# Patient Record
Sex: FEMALE | Race: WHITE | URBAN - METROPOLITAN AREA
[De-identification: names, ages, dates, MRNs, and addresses within clinical notes are randomized per-mention and may not be internally consistent; named-entity substitution may affect disease eponyms.]

---

## 2017-02-25 ENCOUNTER — HOSPITAL ENCOUNTER (EMERGENCY)
Facility: CLINIC | Age: 62
Discharge: HOME | End: 2017-02-25
Attending: EMERGENCY MEDICINE | Admitting: EMERGENCY MEDICINE
Payer: OTHER MISCELLANEOUS

## 2017-02-25 VITALS
WEIGHT: 210 LBS | BODY MASS INDEX: 38.64 KG/M2 | OXYGEN SATURATION: 96 % | HEIGHT: 62 IN | SYSTOLIC BLOOD PRESSURE: 145 MMHG | TEMPERATURE: 98.4 F | RESPIRATION RATE: 18 BRPM | DIASTOLIC BLOOD PRESSURE: 83 MMHG

## 2017-02-25 DIAGNOSIS — S00.03XA SCALP CONTUSION: ICD-10-CM

## 2017-02-25 DIAGNOSIS — S09.90XA CLOSED HEAD INJURY, INITIAL ENCOUNTER: ICD-10-CM
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
## 2017-02-25 PROCEDURE — 99283 EMERGENCY DEPT VISIT LOW MDM: CPT

## 2017-02-25 ASSESSMENT — ENCOUNTER SYMPTOMS
HEADACHES: 1
NUMBNESS: 0
VOMITING: 0
NAUSEA: 1
WEAKNESS: 0

## 2017-02-25 NOTE — LETTER
EMERGENCY DEPARTMENT  [address] [facility] [address]  [phone]    2017    Doretha Alejandro  [address] [place] [address]  [place] (home)     : 1955      To Whom it may concern:    Doretha Alejandro was seen in our Emergency Department today, 2017.  I expect her condition to improve.  She may return to work but please excuse on 2017 so she can recover from her medical issue.    Sincerely,        Cade Martin

## 2017-02-25 NOTE — ED AVS SNAPSHOT
Emergency Department    [address] [place] [address]    Phone:  [phone]    Fax:  [phone]                                       Doretha Alejandro   MRN: [id number]    Department:   Emergency Department   Date of Visit:  2/25/2017           Patient Information     Date Of Birth          1955        Your diagnoses for this visit were:     Closed head injury, initial encounter     Scalp contusion     Fall from ladder, initial encounter        You were seen by Cade Martin MD.      Follow-up Information     Please follow up.    Why:  tylenol or motrin and ice for scalp pain - return if any concerns        Discharge Instructions       Discharge Instructions  Head Injury    You have been seen today for a head injury. You were checked for serious problems, like bleeding on the brain, but these problems cannot always be found right away.  Due to this risk, you should not be alone for 24 hours after your injury.  Follow up with your regular physician as needed. If you are taking a blood thinner, such as aspirin, Pradaxa  (dabigatran), Coumadin  (warfarin), or Plavix  (clopidogrel), you are at especially high risk for immediate or delayed bleeding, and need to re-check with a physician in 24 hours, or sooner if any of the symptoms below happen.     Return to the Emergency Department if:    You are confused, have amnesia, or you are not acting right.    Your headache gets worse or you start to have a really bad headache even with your recommended treatment plan.    You vomit more than once.    You have a convulsion or seizure.    You have trouble walking.    You have weakness or paralysis in an arm or a leg.    You have blood or fluid coming from your ears or nose.    You have new symptoms or anything that worries you.    Sleeping:  It is okay for you to sleep, but someone should wake you up as instructed by your doctor, and someone should check on you at your usual time to wake  up.     Activity:    Do not drive for at least 24 hours.    Do not drive if you have dizzy spells or trouble concentrating, or remembering things.    Do not return to any contact sports until cleared by your regular doctor.     Follow-up:  It is very important that you make an appointment with your clinic and go to the appointment.  If you do not follow-up with your regular doctor, it may result in missing an important development which could result in permanent injury or disability and/or lasting pain.  If there is any problem keeping your appointment, call your doctor or return to the Emergency Department.    MORE INFORMATION:    Concussion:  A concussion is a minor head injury that may cause temporary problems with the way your brain works.  Some symptoms include:  confusion, amnesia, nausea and vomiting, dizziness, fatigue, memory or concentration problems, irritability and sleep problems.    CT Scans: Your evaluation today may have included a CT scan (CAT scan) to look for things like bleeding or a skull fracture (break).  CT scans involve radiation and too many CT scans can cause serious health problems like cancer, especially in children.  Because of this, your doctor may not have ordered a CT scan today if they think you are at low risk for a serious or life threatening problem.    If you were given a prescription for medicine here today, be sure to read all of the information (including the package insert) that comes with your prescription.  This will include important information about the medicine, its side effects, and any warnings that you need to know about.  The pharmacist who fills the prescription can provide more information and answer questions you may have about the medicine.  If you have questions or concerns that the pharmacist cannot address, please call or return to the Emergency Department.       24 Hour Appointment Hotline       To make an appointment at any [facility], call  [phone] ([phone]). If you don't have a family doctor or clinic, we will help you find one. [facility] clinics are conveniently located to serve the needs of you and your family.             Review of your medicines      Our records show that you are taking the medicines listed below. If these are incorrect, please call your family doctor or clinic.        Dose / Directions Last dose taken    EFFEXOR PO        Take by mouth 3 times daily   Refills:  0        ESCITALOPRAM OXALATE PO   Dose:  15 mg        Take 15 mg by mouth   Refills:  0        LORAZEPAM PO   Dose:  0.5 mg        Take 0.5 mg by mouth   Refills:  0        METADATE CD PO   Dose:  20 mg        Take 20 mg by mouth   Refills:  0        NAPROXEN PO        Refills:  0        PRISTIQ PO        Refills:  0        SIMVASTATIN PO   Dose:  20 mg        Take 20 mg by mouth   Refills:  0        SYNTHROID PO        Refills:  0                Orders Needing Specimen Collection     None      Pending Results     No orders found from 2/23/2017 to 2/26/2017.            Pending Culture Results     No orders found from 2/23/2017 to 2/26/2017.             Test Results from your hospital stay            Clinical Quality Measure: Blood Pressure Screening     Your blood pressure was checked while you were in the emergency department today. The last reading we obtained was  BP: 145/83 . Please read the guidelines below about what these numbers mean and what you should do about them.  If your systolic blood pressure (the top number) is less than 120 and your diastolic blood pressure (the bottom number) is less than 80, then your blood pressure is normal. There is nothing more that you need to do about it.  If your systolic blood pressure (the top number) is 120-139 or your diastolic blood pressure (the bottom number) is 80-89, your blood pressure may be higher than it should be. You should have your blood pressure rechecked within a year by a primary care  "provider.  If your systolic blood pressure (the top number) is 140 or greater or your diastolic blood pressure (the bottom number) is 90 or greater, you may have high blood pressure. High blood pressure is treatable, but if left untreated over time it can put you at risk for heart attack, stroke, or kidney failure. You should have your blood pressure rechecked by a primary care provider within the next 4 weeks.  If your provider in the emergency department today gave you specific instructions to follow-up with your doctor or provider even sooner than that, you should follow that instruction and not wait for up to 4 weeks for your follow-up visit.        Thank you for choosing [facility]       Thank you for choosing [facility] for your care. Our goal is always to provide you with excellent care. Hearing back from our patients is one way we can continue to improve our services. Please take a few minutes to complete the written survey that you may receive in the mail after you visit with us. Thank you!        [employer] Information     [employer] lets you send messages to your doctor, view your test results, renew your prescriptions, schedule appointments and more. To sign up, go to [email or web address] . Click on \"Log in\" on the left side of the screen, which will take you to the Welcome page. Then click on \"Sign up Now\" on the right side of the page.     You will be asked to enter the access code listed below, as well as some personal information. Please follow the directions to create your username and password.     Your access code is: [email or web address]  Expires: 2017  8:10 PM     Your access code will  in 90 days. If you need help or a new code, please call your [facility] clinic or [phone].        Care EveryWhere ID     This is your Care EveryWhere ID. This could be used by other organizations to access your [facility] medical records  [id number]        After Visit Summary       This is your record. Keep this " with you and show to your community pharmacist(s) and doctor(s) at your next visit.

## 2017-02-25 NOTE — ED AVS SNAPSHOT
Emergency Department    [address] [facility] [address]    Phone:  [phone]    Fax:  [phone]                                       Doretha Alejandro   MRN: [id number]    Department:   Emergency Department   Date of Visit:  2/25/2017           After Visit Summary Signature Page     I have received my discharge instructions, and my questions have been answered. I have discussed any challenges I see with this plan with the nurse or doctor.    ..........................................................................................................................................  Patient/Patient Representative Signature      ..........................................................................................................................................  Patient Representative Print Name and Relationship to Patient    ..................................................               ................................................  Date                                            Time    ..........................................................................................................................................  Reviewed by Signature/Title    ...................................................              ..............................................  Date                                                            Time

## 2017-02-26 NOTE — DISCHARGE INSTRUCTIONS
Discharge Instructions  Head Injury    You have been seen today for a head injury. You were checked for serious problems, like bleeding on the brain, but these problems cannot always be found right away.  Due to this risk, you should not be alone for 24 hours after your injury.  Follow up with your regular physician as needed. If you are taking a blood thinner, such as aspirin, Pradaxa  (dabigatran), Coumadin  (warfarin), or Plavix  (clopidogrel), you are at especially high risk for immediate or delayed bleeding, and need to re-check with a physician in 24 hours, or sooner if any of the symptoms below happen.     Return to the Emergency Department if:    You are confused, have amnesia, or you are not acting right.    Your headache gets worse or you start to have a really bad headache even with your recommended treatment plan.    You vomit more than once.    You have a convulsion or seizure.    You have trouble walking.    You have weakness or paralysis in an arm or a leg.    You have blood or fluid coming from your ears or nose.    You have new symptoms or anything that worries you.    Sleeping:  It is okay for you to sleep, but someone should wake you up as instructed by your doctor, and someone should check on you at your usual time to wake up.     Activity:    Do not drive for at least 24 hours.    Do not drive if you have dizzy spells or trouble concentrating, or remembering things.    Do not return to any contact sports until cleared by your regular doctor.     Follow-up:  It is very important that you make an appointment with your clinic and go to the appointment.  If you do not follow-up with your regular doctor, it may result in missing an important development which could result in permanent injury or disability and/or lasting pain.  If there is any problem keeping your appointment, call your doctor or return to the Emergency Department.    MORE INFORMATION:    Concussion:  A concussion is a minor head  injury that may cause temporary problems with the way your brain works.  Some symptoms include:  confusion, amnesia, nausea and vomiting, dizziness, fatigue, memory or concentration problems, irritability and sleep problems.    CT Scans: Your evaluation today may have included a CT scan (CAT scan) to look for things like bleeding or a skull fracture (break).  CT scans involve radiation and too many CT scans can cause serious health problems like cancer, especially in children.  Because of this, your doctor may not have ordered a CT scan today if they think you are at low risk for a serious or life threatening problem.    If you were given a prescription for medicine here today, be sure to read all of the information (including the package insert) that comes with your prescription.  This will include important information about the medicine, its side effects, and any warnings that you need to know about.  The pharmacist who fills the prescription can provide more information and answer questions you may have about the medicine.  If you have questions or concerns that the pharmacist cannot address, please call or return to the Emergency Department.

## 2017-02-26 NOTE — ED PROVIDER NOTES
"  History     Chief Complaint:  Fall    HPI   Doretha Alejandro is a 61 year old female who presents to the emergency department today for evaluation after fall. Patient describes falling from the 3rd step of a ladder while at work. Patient reports landing first onto buttock and then backwards onto head, and has since developed a headache localized around the lump on her scalp. She denies loss of consciousness. Patient also reports some nausea but believes this to be related to anxiety. Patient denies anticoagulation. She also denies other associated pains, dental pains, numbness, weakness, or vomiting. No other concerns were voiced at this time.     Allergies:  Penicillincs     Medications:    Simvastatin  Lorazepam  Effexor  Pristiq  Synthroid  Metadate  Naproxen     Past Medical History:    History reviewed. No pertinent past medical history.    Past Surgical History:    History reviewed. No pertinent past surgical history.    Family History:    History reviewed. No pertinent family history.     Social History:  The patient was accompanied to the ED by visitor.  Smoking Status: Negative  Alcohol Use: Negative     Review of Systems   HENT: Negative for dental problem.    Gastrointestinal: Positive for nausea. Negative for vomiting.   Neurological: Positive for headaches. Negative for weakness and numbness.   All other systems reviewed and are negative.    Physical Exam   First Vitals:  BP: 145/83  Heart Rate: 88  Temp: 98.4  F (36.9  C)  Resp: 18  Height: 157.5 cm (5' 2\")  Weight: 95.3 kg (210 lb)  SpO2: 96 %      Physical Exam  SKIN:  No contusions or abrasions.  HEMATOLOGIC/IMMUNOLOGIC/LYMPHATIC:  No pallor.  HENT:  No facial trauma.  No oral or dental trauma.  Contusion of the scalp at the right side of the vertex.  EYES:  PERRL, no ocular trauma, normal EOM.  CARDIOVASCULAR:  Normal rate and a regular rhythm.  RESPIRATORY:  No respiratory distress, breath sounds equal and normal.  GASTROINTESTINAL:  Soft " nontender abdomen.  MUSCULOSKELETAL:  Full painless ROM of the head/neck/torso/limbs.  Cervical spine non-tender.  NEUROLOGIC:  Alert, conversant, GCS 15.  PSYCHIATRIC:  Normal mood.    Emergency Department Course     Emergency Department Course:  Nursing notes and vitals reviewed.  I performed an exam of the patient as documented above.   At 2004 the patient was evaluated and we discussed plan of care. Patient declines pain control at this time.     I personally reviewed the treatment plan with the Patient and answered all related questions prior to discharge.    Impression & Plan      Medical Decision Making:  Doretha Alejandro is a 61 year old female who presents after a fall from a low height. No concerning findings on exam or history to mandate emergent imaging. Work note provided for tomorrow and I advised her to return for any concerns.     Diagnosis:    ICD-10-CM    1. Closed head injury, initial encounter S09.90XA    2. Scalp contusion S00.03XA    3. Fall from ladder, initial encounter W11.XXXA      Disposition:   Discharge home; plan for follow up with PCP.    Scribe Disclosure:  I, Broderick Milian, am serving as a scribe at 8:01 PM on 2/25/2017 to document services personally performed by Cade Martin MD, based on my observations and the provider's statements to me.   EMERGENCY DEPARTMENT       Cade Martin MD  02/25/17 [id number]

## 2024-12-17 ENCOUNTER — APPOINTMENT (OUTPATIENT)
Dept: CT IMAGING | Facility: HOSPITAL | Age: 69
End: 2024-12-17
Attending: EMERGENCY MEDICINE
Payer: COMMERCIAL

## 2024-12-17 ENCOUNTER — HOSPITAL ENCOUNTER (EMERGENCY)
Facility: HOSPITAL | Age: 69
Discharge: HOME | End: 2024-12-17
Attending: EMERGENCY MEDICINE | Admitting: EMERGENCY MEDICINE
Payer: COMMERCIAL

## 2024-12-17 VITALS
WEIGHT: 208.2 LBS | RESPIRATION RATE: 16 BRPM | SYSTOLIC BLOOD PRESSURE: 129 MMHG | HEART RATE: 72 BPM | BODY MASS INDEX: 38.31 KG/M2 | HEIGHT: 62 IN | DIASTOLIC BLOOD PRESSURE: 85 MMHG | OXYGEN SATURATION: 96 % | TEMPERATURE: 98.1 F

## 2024-12-17 DIAGNOSIS — S00.03XA SCALP HEMATOMA, INITIAL ENCOUNTER: ICD-10-CM

## 2024-12-17 DIAGNOSIS — S09.90XA CLOSED HEAD INJURY, INITIAL ENCOUNTER: ICD-10-CM

## 2024-12-17 DIAGNOSIS — B35.4 TINEA CORPORIS: ICD-10-CM

## 2024-12-17 PROBLEM — E55.9 VITAMIN D DEFICIENCY: Status: ACTIVE | Noted: 2023-05-25

## 2024-12-17 PROBLEM — G51.39 HEMIFACIAL SPASM: Status: ACTIVE | Noted: 2019-01-28

## 2024-12-17 PROBLEM — J45.41 MODERATE PERSISTENT ASTHMA WITH ACUTE EXACERBATION: Status: ACTIVE | Noted: 2024-02-28

## 2024-12-17 PROBLEM — F33.8 SEASONAL DEPRESSION (H): Chronic | Status: ACTIVE | Noted: 2017-11-07

## 2024-12-17 PROCEDURE — 99284 EMERGENCY DEPT VISIT MOD MDM: CPT | Mod: 25 | Performed by: EMERGENCY MEDICINE

## 2024-12-17 PROCEDURE — 70450 CT HEAD/BRAIN W/O DYE: CPT

## 2024-12-17 PROCEDURE — 250N000013 HC RX MED GY IP 250 OP 250 PS 637: Performed by: EMERGENCY MEDICINE

## 2024-12-17 RX ORDER — HYDROCODONE BITARTRATE AND ACETAMINOPHEN 5; 325 MG/1; MG/1
1 TABLET ORAL EVERY 6 HOURS PRN
Qty: 10 TABLET | Refills: 0 | Status: SHIPPED | OUTPATIENT
Start: 2024-12-17

## 2024-12-17 RX ORDER — ONDANSETRON 4 MG/1
4 TABLET, ORALLY DISINTEGRATING ORAL EVERY 8 HOURS PRN
Qty: 15 TABLET | Refills: 0 | Status: SHIPPED | OUTPATIENT
Start: 2024-12-17

## 2024-12-17 RX ORDER — HYDROCODONE BITARTRATE AND ACETAMINOPHEN 5; 325 MG/1; MG/1
1 TABLET ORAL ONCE
Status: COMPLETED | OUTPATIENT
Start: 2024-12-17 | End: 2024-12-17

## 2024-12-17 RX ORDER — CLOTRIMAZOLE 1 %
CREAM (GRAM) TOPICAL 2 TIMES DAILY
Qty: 113 G | Refills: 0 | Status: SHIPPED | OUTPATIENT
Start: 2024-12-17 | End: 2025-01-07

## 2024-12-17 RX ADMIN — HYDROCODONE BITARTRATE AND ACETAMINOPHEN 1 TABLET: 5; 325 TABLET ORAL at 21:36

## 2024-12-17 ASSESSMENT — ACTIVITIES OF DAILY LIVING (ADL)
ADLS_ACUITY_SCORE: 41
ADLS_ACUITY_SCORE: 41

## 2024-12-17 ASSESSMENT — COLUMBIA-SUICIDE SEVERITY RATING SCALE - C-SSRS
1. IN THE PAST MONTH, HAVE YOU WISHED YOU WERE DEAD OR WISHED YOU COULD GO TO SLEEP AND NOT WAKE UP?: NO
6. HAVE YOU EVER DONE ANYTHING, STARTED TO DO ANYTHING, OR PREPARED TO DO ANYTHING TO END YOUR LIFE?: NO
2. HAVE YOU ACTUALLY HAD ANY THOUGHTS OF KILLING YOURSELF IN THE PAST MONTH?: NO

## 2024-12-18 NOTE — ED PROVIDER NOTES
EMERGENCY DEPARTMENT ENCOUNTER      NAME: Doretha Alejandro  AGE: 69 year old female  YOB: 1955  MRN: [id number]  EVALUATION DATE & TIME: No admission date for patient encounter.    PCP: Milagro Coronado    ED PROVIDER: Vita Harp M.D.      Chief Complaint   Patient presents with    Fall       FINAL IMPRESSION:  1. Closed head injury, initial encounter    2. Scalp hematoma, initial encounter    3. Tinea corporis        ED COURSE & MEDICAL DECISION MAKIN. Closed head injury.  Differentials considered include intracranial hemorrhage, fracture, concussion, post traumatic headache, neck fracture vs ligamentous injury vs musculoskeletal injury vs traumatic cord injury.  Neurologic exam normal without paresthesias to UE.   Head CT ordered given hematoma present. No laceration present. No blood thinner use.  NEXUS negative, no indication for neck CT.  No drugs or alcohol on board, do not suspect distracting injury.   Initially, patient declined pain medication. However, once she was roomed, she did request pain medication and I ordered 1 PO hydrocodone.  Educated patient regarding low suspicion for dangerous pathology, discussed meds, return precautions, and follow up.     2. Right flank skin lesion present.  Itchy, present for weeks without systemic symptoms.  Suspect tinea corporis given appearance.  Treat with topical clotrimazole.  Discharged home.         7:39 PM I met with the patient to gather history and to perform my initial exam. I discussed the plan for care while in the Emergency Department.  ED Course as of 12/17/24 2144   Tue Dec 17, 2024   2130 CT read and reviewed by myself and negative. I informed patient of findings. I discussed return precautions and follow up.     Pertinent Labs & Imaging studies reviewed. (See chart for details).    Medical Decision Making  Obtained supplemental history:Supplemental history obtained?: Documented in chart and Family Member/Significant  Other  Reviewed external records: External records reviewed?: Documented in chart and Other: office visit 5/29/24  Care impacted by chronic illness:Other: hx of dyspnea with neg cardiac workup, PFTs neg for asthma, hypothyroidism, hyperlipidemia, MDD  Did you consider but not order tests?: Work up considered but not performed and documented in chart, if applicable  Did you interpret images independently?: Independent interpretation of ECG and images noted in documentation, when applicable.  Consultation discussion with other provider:Did you involve another provider (consultant, , pharmacy, etc.)?: No  Discharge. I prescribed additional prescription strength medication(s) as charted. See documentation for any additional details.    MIPS: Adult Minor Head Trauma:Age 65 years or older        At the conclusion of the encounter I discussed the results of all of the tests and the disposition. The questions were answered. The patient or family acknowledged understanding and was agreeable with the care plan.      CRITICAL CARE:  N/A    HPI    Patient information was obtained from: patient    Use of : N/A .       Doretha Alejandro is a 69 year old female who presents by private car for evaluation of a fall and head injury.    Around 1800 this evening, patient lost her balance and had a mechanical fall, hitting the right side of her head on a metal pole. She did not lose consciousness or have any preceding symptoms, including chest pain. Patient is not on blood thinners. Now, she endorses swelling and intermittent pain to the right side of her head. Patient also bit the bottom of her tongue in the fall although does not have much pain to this area. Patient notes a history of a baseline facial twitch.    Patient also notes a small rash on her right flank that appeared a couple of months ago. She has been applying hydrocortisone cream to the area without relief.    Patient denies any history of intracranial or  "neck surgeries. She also denies neck pain, other pain, numbness or tingling down her arms, chipped or lost teeth, and any other symptoms or complaints at this time.       REVIEW OF SYSTEMS  All other systems negative unless noted in HPI.    PAST MEDICAL HISTORY:  History reviewed. No pertinent past medical history.    PAST SURGICAL HISTORY:  History reviewed. No pertinent surgical history.      CURRENT MEDICATIONS:    No current facility-administered medications for this encounter.     Current Outpatient Medications   Medication Sig Dispense Refill    clotrimazole (LOTRIMIN) 1 % external cream Apply topically 2 times daily for 21 days. 113 g 0    HYDROcodone-acetaminophen (NORCO) 5-325 MG tablet Take 1 tablet by mouth every 6 hours as needed for severe pain. 10 tablet 0    ondansetron (ZOFRAN ODT) 4 MG ODT tab Take 1 tablet (4 mg) by mouth every 8 hours as needed for nausea or vomiting. 15 tablet 0    Desvenlafaxine Succinate (PRISTIQ PO)       ESCITALOPRAM OXALATE PO Take 15 mg by mouth      Levothyroxine Sodium (SYNTHROID PO)       LORAZEPAM PO Take 0.5 mg by mouth      Methylphenidate HCl (METADATE CD PO) Take 20 mg by mouth      NAPROXEN PO       SIMVASTATIN PO Take 20 mg by mouth      Venlafaxine HCl (EFFEXOR PO) Take by mouth 3 times daily           ALLERGIES:  Allergies   Allergen Reactions    Penicillins Anaphylaxis       FAMILY HISTORY:  No family history on file.    SOCIAL HISTORY:  Social History     Socioeconomic History    Marital status:    Tobacco Use    Smoking status: Never   Substance and Sexual Activity    Alcohol use: No    Drug use: No       VITALS:  Patient Vitals for the past 24 hrs:   BP Temp Temp src Pulse Resp SpO2 Height Weight   12/17/24 2100 123/77 -- -- 74 -- 94 % -- --   12/17/24 1911 126/84 98.1  F (36.7  C) Oral 92 16 96 % 1.575 m (5' 2\") 94.4 kg (208 lb 3.2 oz)       PHYSICAL EXAM    VITAL SIGNS: /77   Pulse 74   Temp 98.1  F (36.7  C) (Oral)   Resp 16   Ht 1.575 m " "(5' 2\")   Wt 94.4 kg (208 lb 3.2 oz)   SpO2 94%   BMI 38.08 kg/m    Physical Exam  Vitals and nursing note reviewed.   Constitutional:       General: She is not in acute distress.     Appearance: She is obese. She is not toxic-appearing.   HENT:      Head: Normocephalic.      Comments: Hematoma left parietal scalp without laceration, mildly tender to palpation.      Right Ear: External ear normal.      Left Ear: External ear normal.      Nose: Nose normal.      Mouth/Throat:      Mouth: Mucous membranes are moist.      Comments: Ecchymosis to tip of underside of tongue, no lacerations, not bleeding.  Eyes:      General: No scleral icterus.        Right eye: No discharge.         Left eye: No discharge.      Extraocular Movements: Extraocular movements intact.      Pupils: Pupils are equal, round, and reactive to light.   Cardiovascular:      Rate and Rhythm: Normal rate and regular rhythm.   Pulmonary:      Effort: Pulmonary effort is normal. No respiratory distress.      Breath sounds: Normal breath sounds.   Abdominal:      Comments: Round 4 inch scaly slightly raised non blanchable lesion right flank. Non tender.   Musculoskeletal:      Cervical back: Neck supple. No rigidity.      Comments: No visible trauma to arms or legs. No pain to ROM of shoulders, elbows, wrists, fingers, or lower extremities.   Skin:     General: Skin is warm and dry.      Capillary Refill: Capillary refill takes less than 2 seconds.      Findings: No bruising.   Neurological:      General: No focal deficit present.      Mental Status: She is alert and oriented to person, place, and time. Mental status is at baseline.      Comments: No slurred speech, following commands spontaneously. No facial droop. Left sided facial spasms noted, baseline per patient.   Psychiatric:         Mood and Affect: Mood normal.         Behavior: Behavior normal.         LABS  Labs Ordered and Resulted from Time of ED Arrival to Time of ED Departure - No " data to display      RADIOLOGY  Head CT w/o contrast   Final Result   IMPRESSION:   1.  A moderately-sized left parietal scalp hematoma without an acute intracranial abnormality or calvarial injury.   2.  Chronic intracranial changes, as described.         I have independently reviewed the above image. See radiology report for detail.      EKG:    NA       PROCEDURES:  N/A      MEDICATIONS GIVEN IN THE EMERGENCY:  Medications   HYDROcodone-acetaminophen (NORCO) 5-325 MG per tablet 1 tablet (1 tablet Oral $Given 12/17/24 7790)       NEW PRESCRIPTIONS STARTED AT TODAY'S ER VISIT  New Prescriptions    CLOTRIMAZOLE (LOTRIMIN) 1 % EXTERNAL CREAM    Apply topically 2 times daily for 21 days.    HYDROCODONE-ACETAMINOPHEN (NORCO) 5-325 MG TABLET    Take 1 tablet by mouth every 6 hours as needed for severe pain.    ONDANSETRON (ZOFRAN ODT) 4 MG ODT TAB    Take 1 tablet (4 mg) by mouth every 8 hours as needed for nausea or vomiting.        I, Sagrario Moseley, am serving as a scribe to document services personally performed by Vita Harp MD, based on my observations and the provider's statements to me.  I, Vita Harp MD, attest that Sagrario Moseley is acting in a scribe capacity, has observed my performance of the services and has documented them in accordance with my direction.     Vita Harp MD  Emergency Medicine  [facility] EMERGENCY DEPARTMENT  [address] [place] [id number] [phone]  Dept: [phone]            Vita Harp MD  12/19/24 6995

## 2024-12-18 NOTE — DISCHARGE INSTRUCTIONS
Your CT head is negative for fracture.  Ice the hematoma, monitor yourself for concussion symptoms.  See your primary care doctor for symptom recheck within 3-4 days.

## 2024-12-18 NOTE — ED TRIAGE NOTES
"Patient fell in back yard at around 1800. Patient did strike head on metal pole that was holding up \"porch swing\". Lump can be felt on left side of head, grapefruit size. Patient denies LOC, patient not on thinners. Patient alert and oriented. Patient states that facial tics on left side are baseline. PERRLA intact.     Triage Assessment (Adult)       Row Name 12/17/24 4903          Triage Assessment    Airway WDL WDL        Respiratory WDL    Respiratory WDL WDL        Skin Circulation/Temperature WDL    Skin Circulation/Temperature WDL WDL        Cardiac WDL    Cardiac WDL WDL        Peripheral/Neurovascular WDL    Peripheral Neurovascular WDL WDL        Cognitive/Neuro/Behavioral WDL    Cognitive/Neuro/Behavioral WDL WDL                     "